# Patient Record
Sex: MALE | Race: WHITE | NOT HISPANIC OR LATINO | Employment: OTHER | URBAN - METROPOLITAN AREA
[De-identification: names, ages, dates, MRNs, and addresses within clinical notes are randomized per-mention and may not be internally consistent; named-entity substitution may affect disease eponyms.]

---

## 2017-01-16 ENCOUNTER — HOSPITAL ENCOUNTER (EMERGENCY)
Facility: HOSPITAL | Age: 82
Discharge: HOME | End: 2017-01-16
Attending: EMERGENCY MEDICINE | Admitting: EMERGENCY MEDICINE
Payer: COMMERCIAL

## 2017-01-16 ENCOUNTER — APPOINTMENT (EMERGENCY)
Dept: RADIOLOGY | Facility: HOSPITAL | Age: 82
End: 2017-01-16
Payer: COMMERCIAL

## 2017-01-16 VITALS
WEIGHT: 150 LBS | HEART RATE: 69 BPM | HEIGHT: 70 IN | BODY MASS INDEX: 21.47 KG/M2 | OXYGEN SATURATION: 99 % | DIASTOLIC BLOOD PRESSURE: 76 MMHG | RESPIRATION RATE: 16 BRPM | SYSTOLIC BLOOD PRESSURE: 163 MMHG | TEMPERATURE: 97.5 F

## 2017-01-16 DIAGNOSIS — R25.1 SHAKINESS: Primary | ICD-10-CM

## 2017-01-16 DIAGNOSIS — R26.81 UNSTEADY: ICD-10-CM

## 2017-01-16 LAB
ANION GAP SERPL CALCULATED.3IONS-SCNC: 7 MMOL/L (ref 4–13)
ATRIAL RATE: 61 BPM
BASOPHILS # BLD AUTO: 0.02 THOUSANDS/ΜL (ref 0–0.1)
BASOPHILS NFR BLD AUTO: 0 % (ref 0–1)
BUN SERPL-MCNC: 17 MG/DL (ref 5–25)
CALCIUM SERPL-MCNC: 9.8 MG/DL (ref 8.3–10.1)
CHLORIDE SERPL-SCNC: 102 MMOL/L (ref 100–108)
CO2 SERPL-SCNC: 28 MMOL/L (ref 21–32)
CREAT SERPL-MCNC: 0.97 MG/DL (ref 0.6–1.3)
EOSINOPHIL # BLD AUTO: 0.08 THOUSAND/ΜL (ref 0–0.61)
EOSINOPHIL NFR BLD AUTO: 1 % (ref 0–6)
ERYTHROCYTE [DISTWIDTH] IN BLOOD BY AUTOMATED COUNT: 13 % (ref 11.6–15.1)
GFR SERPL CREATININE-BSD FRML MDRD: >60 ML/MIN/1.73SQ M
GLUCOSE SERPL-MCNC: 106 MG/DL (ref 65–140)
HCT VFR BLD AUTO: 40.1 % (ref 36.5–49.3)
HGB BLD-MCNC: 13.6 G/DL (ref 12–17)
LYMPHOCYTES # BLD AUTO: 1.02 THOUSANDS/ΜL (ref 0.6–4.47)
LYMPHOCYTES NFR BLD AUTO: 18 % (ref 14–44)
MCH RBC QN AUTO: 31.3 PG (ref 26.8–34.3)
MCHC RBC AUTO-ENTMCNC: 33.9 G/DL (ref 31.4–37.4)
MCV RBC AUTO: 92 FL (ref 82–98)
MONOCYTES # BLD AUTO: 0.34 THOUSAND/ΜL (ref 0.17–1.22)
MONOCYTES NFR BLD AUTO: 6 % (ref 4–12)
NEUTROPHILS # BLD AUTO: 4.2 THOUSANDS/ΜL (ref 1.85–7.62)
NEUTS SEG NFR BLD AUTO: 75 % (ref 43–75)
NRBC BLD AUTO-RTO: 0 /100 WBCS
P AXIS: 78 DEGREES
PLATELET # BLD AUTO: 156 THOUSANDS/UL (ref 149–390)
PMV BLD AUTO: 10.3 FL (ref 8.9–12.7)
POTASSIUM SERPL-SCNC: 4.9 MMOL/L (ref 3.5–5.3)
PR INTERVAL: 168 MS
QRS AXIS: -31 DEGREES
QRSD INTERVAL: 144 MS
QT INTERVAL: 432 MS
QTC INTERVAL: 434 MS
RBC # BLD AUTO: 4.34 MILLION/UL (ref 3.88–5.62)
SODIUM SERPL-SCNC: 137 MMOL/L (ref 136–145)
SPECIMEN SOURCE: NORMAL
T WAVE AXIS: 87 DEGREES
TROPONIN I BLD-MCNC: 0.01 NG/ML (ref 0–0.08)
VENTRICULAR RATE: 61 BPM
WBC # BLD AUTO: 5.68 THOUSAND/UL (ref 4.31–10.16)

## 2017-01-16 PROCEDURE — 84484 ASSAY OF TROPONIN QUANT: CPT

## 2017-01-16 PROCEDURE — 36415 COLL VENOUS BLD VENIPUNCTURE: CPT | Performed by: EMERGENCY MEDICINE

## 2017-01-16 PROCEDURE — 99285 EMERGENCY DEPT VISIT HI MDM: CPT

## 2017-01-16 PROCEDURE — 70450 CT HEAD/BRAIN W/O DYE: CPT

## 2017-01-16 PROCEDURE — 80048 BASIC METABOLIC PNL TOTAL CA: CPT | Performed by: EMERGENCY MEDICINE

## 2017-01-16 PROCEDURE — 85025 COMPLETE CBC W/AUTO DIFF WBC: CPT | Performed by: EMERGENCY MEDICINE

## 2017-01-16 PROCEDURE — 71020 HB CHEST X-RAY 2VW FRONTAL&LATL: CPT

## 2017-01-16 PROCEDURE — 93005 ELECTROCARDIOGRAM TRACING: CPT | Performed by: EMERGENCY MEDICINE

## 2017-01-16 RX ORDER — FLUTICASONE PROPIONATE 50 MCG
1 SPRAY, SUSPENSION (ML) NASAL DAILY
COMMUNITY

## 2017-01-16 RX ORDER — ASPIRIN 81 MG/1
81 TABLET ORAL DAILY
COMMUNITY
End: 2018-09-10

## 2017-01-16 RX ORDER — ONDANSETRON 2 MG/ML
INJECTION INTRAMUSCULAR; INTRAVENOUS
Status: COMPLETED
Start: 2017-01-16 | End: 2017-01-16

## 2017-01-16 RX ORDER — HYDROCHLOROTHIAZIDE 12.5 MG/1
12.5 TABLET ORAL DAILY
COMMUNITY

## 2017-01-16 RX ORDER — ATORVASTATIN CALCIUM 10 MG/1
10 TABLET, FILM COATED ORAL DAILY
COMMUNITY

## 2018-09-10 ENCOUNTER — HOSPITAL ENCOUNTER (INPATIENT)
Facility: HOSPITAL | Age: 83
LOS: 1 days | Discharge: HOME | DRG: 282 | End: 2018-09-13
Attending: EMERGENCY MEDICINE | Admitting: FAMILY MEDICINE
Payer: COMMERCIAL

## 2018-09-10 ENCOUNTER — APPOINTMENT (EMERGENCY)
Dept: RADIOLOGY | Facility: HOSPITAL | Age: 83
DRG: 282 | End: 2018-09-10
Payer: COMMERCIAL

## 2018-09-10 DIAGNOSIS — R74.8 CARDIAC ENZYMES ELEVATED: ICD-10-CM

## 2018-09-10 DIAGNOSIS — R77.8 ELEVATED TROPONIN: ICD-10-CM

## 2018-09-10 DIAGNOSIS — F03.90 DEMENTIA (HCC): ICD-10-CM

## 2018-09-10 DIAGNOSIS — R41.82 ALTERED MENTAL STATUS: Primary | ICD-10-CM

## 2018-09-10 LAB
ALBUMIN SERPL BCP-MCNC: 4.1 G/DL (ref 3.5–5)
ALP SERPL-CCNC: 63 U/L (ref 46–116)
ALT SERPL W P-5'-P-CCNC: 34 U/L (ref 12–78)
ANION GAP SERPL CALCULATED.3IONS-SCNC: 9 MMOL/L (ref 4–13)
AST SERPL W P-5'-P-CCNC: 39 U/L (ref 5–45)
BASOPHILS # BLD AUTO: 0.02 THOUSANDS/ΜL (ref 0–0.1)
BASOPHILS NFR BLD AUTO: 0 % (ref 0–1)
BILIRUB SERPL-MCNC: 0.7 MG/DL (ref 0.2–1)
BUN SERPL-MCNC: 15 MG/DL (ref 5–25)
CALCIUM SERPL-MCNC: 9.6 MG/DL (ref 8.3–10.1)
CHLORIDE SERPL-SCNC: 101 MMOL/L (ref 100–108)
CO2 SERPL-SCNC: 30 MMOL/L (ref 21–32)
CREAT SERPL-MCNC: 0.8 MG/DL (ref 0.6–1.3)
EOSINOPHIL # BLD AUTO: 0.07 THOUSAND/ΜL (ref 0–0.61)
EOSINOPHIL NFR BLD AUTO: 1 % (ref 0–6)
ERYTHROCYTE [DISTWIDTH] IN BLOOD BY AUTOMATED COUNT: 13.2 % (ref 11.6–15.1)
ETHANOL SERPL-MCNC: <3 MG/DL (ref 0–3)
GFR SERPL CREATININE-BSD FRML MDRD: 80 ML/MIN/1.73SQ M
GLUCOSE SERPL-MCNC: 108 MG/DL (ref 65–140)
GLUCOSE SERPL-MCNC: 114 MG/DL (ref 65–140)
HCT VFR BLD AUTO: 42.8 % (ref 36.5–49.3)
HGB BLD-MCNC: 14.1 G/DL (ref 12–17)
IMM GRANULOCYTES # BLD AUTO: 0.01 THOUSAND/UL (ref 0–0.2)
IMM GRANULOCYTES NFR BLD AUTO: 0 % (ref 0–2)
LYMPHOCYTES # BLD AUTO: 0.91 THOUSANDS/ΜL (ref 0.6–4.47)
LYMPHOCYTES NFR BLD AUTO: 13 % (ref 14–44)
MAGNESIUM SERPL-MCNC: 1.9 MG/DL (ref 1.6–2.6)
MCH RBC QN AUTO: 31.5 PG (ref 26.8–34.3)
MCHC RBC AUTO-ENTMCNC: 32.9 G/DL (ref 31.4–37.4)
MCV RBC AUTO: 96 FL (ref 82–98)
MONOCYTES # BLD AUTO: 0.58 THOUSAND/ΜL (ref 0.17–1.22)
MONOCYTES NFR BLD AUTO: 8 % (ref 4–12)
NEUTROPHILS # BLD AUTO: 5.53 THOUSANDS/ΜL (ref 1.85–7.62)
NEUTS SEG NFR BLD AUTO: 78 % (ref 43–75)
NRBC BLD AUTO-RTO: 0 /100 WBCS
PHOSPHATE SERPL-MCNC: 2.6 MG/DL (ref 2.3–4.1)
PLATELET # BLD AUTO: 173 THOUSANDS/UL (ref 149–390)
PMV BLD AUTO: 10.3 FL (ref 8.9–12.7)
POTASSIUM SERPL-SCNC: 3.9 MMOL/L (ref 3.5–5.3)
PROT SERPL-MCNC: 7.5 G/DL (ref 6.4–8.2)
RBC # BLD AUTO: 4.47 MILLION/UL (ref 3.88–5.62)
SODIUM SERPL-SCNC: 140 MMOL/L (ref 136–145)
TROPONIN I SERPL-MCNC: 0.16 NG/ML
WBC # BLD AUTO: 7.12 THOUSAND/UL (ref 4.31–10.16)

## 2018-09-10 PROCEDURE — 80053 COMPREHEN METABOLIC PANEL: CPT | Performed by: EMERGENCY MEDICINE

## 2018-09-10 PROCEDURE — 36415 COLL VENOUS BLD VENIPUNCTURE: CPT | Performed by: EMERGENCY MEDICINE

## 2018-09-10 PROCEDURE — 85025 COMPLETE CBC W/AUTO DIFF WBC: CPT | Performed by: EMERGENCY MEDICINE

## 2018-09-10 PROCEDURE — 84100 ASSAY OF PHOSPHORUS: CPT | Performed by: EMERGENCY MEDICINE

## 2018-09-10 PROCEDURE — 83735 ASSAY OF MAGNESIUM: CPT | Performed by: EMERGENCY MEDICINE

## 2018-09-10 PROCEDURE — 82948 REAGENT STRIP/BLOOD GLUCOSE: CPT

## 2018-09-10 PROCEDURE — 80320 DRUG SCREEN QUANTALCOHOLS: CPT | Performed by: EMERGENCY MEDICINE

## 2018-09-10 PROCEDURE — 84484 ASSAY OF TROPONIN QUANT: CPT | Performed by: EMERGENCY MEDICINE

## 2018-09-10 PROCEDURE — G0480 DRUG TEST DEF 1-7 CLASSES: HCPCS | Performed by: EMERGENCY MEDICINE

## 2018-09-10 PROCEDURE — 70450 CT HEAD/BRAIN W/O DYE: CPT

## 2018-09-10 PROCEDURE — 93005 ELECTROCARDIOGRAM TRACING: CPT

## 2018-09-10 RX ORDER — ASPIRIN 81 MG/1
324 TABLET, CHEWABLE ORAL ONCE
Status: COMPLETED | OUTPATIENT
Start: 2018-09-10 | End: 2018-09-10

## 2018-09-10 RX ADMIN — ASPIRIN 81 MG 324 MG: 81 TABLET ORAL at 23:20

## 2018-09-11 PROBLEM — F03.90 DEMENTIA (HCC): Status: ACTIVE | Noted: 2018-09-11

## 2018-09-11 PROBLEM — I10 HYPERTENSION: Status: ACTIVE | Noted: 2018-09-11

## 2018-09-11 PROBLEM — R77.8 ELEVATED TROPONIN: Status: ACTIVE | Noted: 2018-09-11

## 2018-09-11 PROBLEM — E78.5 HYPERLIPIDEMIA: Status: ACTIVE | Noted: 2018-09-11

## 2018-09-11 LAB
ANION GAP SERPL CALCULATED.3IONS-SCNC: 6 MMOL/L (ref 4–13)
ATRIAL RATE: 62 BPM
ATRIAL RATE: 68 BPM
ATRIAL RATE: 76 BPM
BACTERIA UR QL AUTO: ABNORMAL /HPF
BILIRUB UR QL STRIP: NEGATIVE
BUN SERPL-MCNC: 14 MG/DL (ref 5–25)
CALCIUM SERPL-MCNC: 9.6 MG/DL (ref 8.3–10.1)
CHLORIDE SERPL-SCNC: 104 MMOL/L (ref 100–108)
CLARITY UR: CLEAR
CO2 SERPL-SCNC: 32 MMOL/L (ref 21–32)
COLOR UR: YELLOW
CREAT SERPL-MCNC: 0.79 MG/DL (ref 0.6–1.3)
ERYTHROCYTE [DISTWIDTH] IN BLOOD BY AUTOMATED COUNT: 13.2 % (ref 11.6–15.1)
GFR SERPL CREATININE-BSD FRML MDRD: 81 ML/MIN/1.73SQ M
GLUCOSE SERPL-MCNC: 79 MG/DL (ref 65–140)
GLUCOSE UR STRIP-MCNC: NEGATIVE MG/DL
HCT VFR BLD AUTO: 42 % (ref 36.5–49.3)
HGB BLD-MCNC: 13.5 G/DL (ref 12–17)
HGB UR QL STRIP.AUTO: ABNORMAL
KETONES UR STRIP-MCNC: NEGATIVE MG/DL
LEUKOCYTE ESTERASE UR QL STRIP: NEGATIVE
MAGNESIUM SERPL-MCNC: 2.1 MG/DL (ref 1.6–2.6)
MCH RBC QN AUTO: 31.3 PG (ref 26.8–34.3)
MCHC RBC AUTO-ENTMCNC: 32.1 G/DL (ref 31.4–37.4)
MCV RBC AUTO: 97 FL (ref 82–98)
NITRITE UR QL STRIP: NEGATIVE
NON-SQ EPI CELLS URNS QL MICRO: ABNORMAL /HPF
P AXIS: 82 DEGREES
P AXIS: 84 DEGREES
P AXIS: 85 DEGREES
PH UR STRIP.AUTO: 5.5 [PH] (ref 5–9)
PHOSPHATE SERPL-MCNC: 3 MG/DL (ref 2.3–4.1)
PLATELET # BLD AUTO: 153 THOUSANDS/UL (ref 149–390)
PMV BLD AUTO: 10.3 FL (ref 8.9–12.7)
POTASSIUM SERPL-SCNC: 4.4 MMOL/L (ref 3.5–5.3)
PR INTERVAL: 156 MS
PR INTERVAL: 156 MS
PR INTERVAL: 160 MS
PROT UR STRIP-MCNC: NEGATIVE MG/DL
QRS AXIS: 18 DEGREES
QRS AXIS: 2 DEGREES
QRS AXIS: 48 DEGREES
QRSD INTERVAL: 138 MS
QRSD INTERVAL: 142 MS
QRSD INTERVAL: 144 MS
QT INTERVAL: 398 MS
QT INTERVAL: 424 MS
QT INTERVAL: 430 MS
QTC INTERVAL: 436 MS
QTC INTERVAL: 447 MS
QTC INTERVAL: 450 MS
RBC # BLD AUTO: 4.32 MILLION/UL (ref 3.88–5.62)
RBC #/AREA URNS AUTO: ABNORMAL /HPF
SODIUM SERPL-SCNC: 142 MMOL/L (ref 136–145)
SP GR UR STRIP.AUTO: 1.02 (ref 1–1.03)
T WAVE AXIS: 87 DEGREES
T WAVE AXIS: 90 DEGREES
T WAVE AXIS: 98 DEGREES
TROPONIN I SERPL-MCNC: 0.28 NG/ML
TROPONIN I SERPL-MCNC: 0.3 NG/ML
TROPONIN I SERPL-MCNC: 0.3 NG/ML
TROPONIN I SERPL-MCNC: 0.32 NG/ML
UROBILINOGEN UR QL STRIP.AUTO: 0.2 E.U./DL
VENTRICULAR RATE: 62 BPM
VENTRICULAR RATE: 68 BPM
VENTRICULAR RATE: 76 BPM
WBC # BLD AUTO: 5.1 THOUSAND/UL (ref 4.31–10.16)
WBC #/AREA URNS AUTO: ABNORMAL /HPF

## 2018-09-11 PROCEDURE — 87081 CULTURE SCREEN ONLY: CPT | Performed by: FAMILY MEDICINE

## 2018-09-11 PROCEDURE — 93010 ELECTROCARDIOGRAM REPORT: CPT | Performed by: INTERNAL MEDICINE

## 2018-09-11 PROCEDURE — 84100 ASSAY OF PHOSPHORUS: CPT | Performed by: FAMILY MEDICINE

## 2018-09-11 PROCEDURE — 80048 BASIC METABOLIC PNL TOTAL CA: CPT | Performed by: FAMILY MEDICINE

## 2018-09-11 PROCEDURE — 99222 1ST HOSP IP/OBS MODERATE 55: CPT | Performed by: INTERNAL MEDICINE

## 2018-09-11 PROCEDURE — 85027 COMPLETE CBC AUTOMATED: CPT | Performed by: FAMILY MEDICINE

## 2018-09-11 PROCEDURE — 84484 ASSAY OF TROPONIN QUANT: CPT | Performed by: FAMILY MEDICINE

## 2018-09-11 PROCEDURE — 81001 URINALYSIS AUTO W/SCOPE: CPT | Performed by: EMERGENCY MEDICINE

## 2018-09-11 PROCEDURE — 93005 ELECTROCARDIOGRAM TRACING: CPT

## 2018-09-11 PROCEDURE — 83735 ASSAY OF MAGNESIUM: CPT | Performed by: FAMILY MEDICINE

## 2018-09-11 PROCEDURE — 99285 EMERGENCY DEPT VISIT HI MDM: CPT

## 2018-09-11 RX ORDER — ACETAMINOPHEN 325 MG/1
650 TABLET ORAL EVERY 6 HOURS PRN
Status: DISCONTINUED | OUTPATIENT
Start: 2018-09-11 | End: 2018-09-13 | Stop reason: HOSPADM

## 2018-09-11 RX ORDER — HYDROCHLOROTHIAZIDE 12.5 MG/1
12.5 TABLET ORAL DAILY
Status: DISCONTINUED | OUTPATIENT
Start: 2018-09-11 | End: 2018-09-13 | Stop reason: HOSPADM

## 2018-09-11 RX ORDER — LOSARTAN POTASSIUM 50 MG/1
50 TABLET ORAL DAILY
Status: DISCONTINUED | OUTPATIENT
Start: 2018-09-11 | End: 2018-09-13 | Stop reason: HOSPADM

## 2018-09-11 RX ORDER — ATORVASTATIN CALCIUM 10 MG/1
10 TABLET, FILM COATED ORAL DAILY
Status: DISCONTINUED | OUTPATIENT
Start: 2018-09-11 | End: 2018-09-13 | Stop reason: HOSPADM

## 2018-09-11 RX ORDER — FLUTICASONE PROPIONATE 50 MCG
1 SPRAY, SUSPENSION (ML) NASAL DAILY
Status: DISCONTINUED | OUTPATIENT
Start: 2018-09-11 | End: 2018-09-13 | Stop reason: HOSPADM

## 2018-09-11 RX ADMIN — HYDROCHLOROTHIAZIDE 12.5 MG: 12.5 TABLET ORAL at 08:58

## 2018-09-11 RX ADMIN — ENOXAPARIN SODIUM 40 MG: 40 INJECTION SUBCUTANEOUS at 08:58

## 2018-09-11 RX ADMIN — LOSARTAN POTASSIUM 50 MG: 50 TABLET, FILM COATED ORAL at 08:58

## 2018-09-11 RX ADMIN — ATORVASTATIN CALCIUM 10 MG: 10 TABLET, FILM COATED ORAL at 08:58

## 2018-09-11 RX ADMIN — FLUTICASONE PROPIONATE 1 SPRAY: 50 SPRAY, METERED NASAL at 08:58

## 2018-09-11 NOTE — ED NOTES
Client placed on bedside monitor due to elevated troponin  Client denies chest pain and denies SOB  Client oriented to the fact that he will be staying in the hospital tonight and he is aware that his wife has been contacted    Call bell given to patient     Vaishali Osuna RN  09/10/18 3000

## 2018-09-11 NOTE — ASSESSMENT & PLAN NOTE
Blood pressure was elevated on arrival with 186/85  Blood pressure has improved  · Will continue with home medication of losartan and hydrochlorothiazide
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
## 2018-09-11 NOTE — ASSESSMENT & PLAN NOTE
Patient has had dementia for the past 1 year that is gradually worsening  Patient gets lost locally    · Alert and oriented X 1  · Stable
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
## 2018-09-11 NOTE — ASSESSMENT & PLAN NOTE
Troponin 0 16 on admission  EKG showed normal sinus rhythm with an old left bundle branch block  Troponin increased 0 16-0 30-0 32-0 30-0 28  · Telemetry monitoring  · Cardiology consulted   · Stress test shows no signs of ischemia/infarction     · Echo results pending   · Awaiting disposition
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
## 2018-09-11 NOTE — CONSULTS
CARDIOLOGY CONSULTATION  Flo Person 80 y o  male MRN: [id number]  Unit/Bed#: [address] [place], [place] [id number] Encounter: [id number]      History of Present Illness   Physician Requesting Consult: Irina Lazaro MD  Reason for Consult / Principal Problem: Elevated troponins    Assessment/Plan     Assessment:  Elevated troponins in the absence of chest pain  Left bundle branch block    Plan:  Echocardiogram Tues  Stress Test NM on Wed  Repeat troponin q 3h until two values stabilized or decline  Daily EKGs  Continue Lipitor 10 mg daily - obtain lipid panel in a m  blood work  Continue home losartan and HCTZ dosing      HPI: Flo Person is a 80y o  year old male with a PMH of severe dementia, HTN, HLD, who presents by AMS after being found driving 5 [place] on [address] due to his dementia  Patient had left his house around 7:00 a m, and was found around 11:00 p m  Lulu Scruggsan Per wife he has had dementia for over 1 year and usually only gets lost in the local area  He has not had any symptoms while here, other than dementia  No chest pain, no palpitations, no edema, no shortness of breath, no abdominal pain  CT H was normal   Troponin in ED was 0 16, and on repeat today was 0 30  History of HTN, no cardiac surgeries  Historical Information   Past Medical History:   Diagnosis Date    Hyperlipidemia     Hypertension      History reviewed  No pertinent surgical history  History   Alcohol Use    Yes     Comment: socially     History   Drug Use No     History   Smoking Status    Former Smoker   Smokeless Tobacco    Never Used     History reviewed  No pertinent family history  Meds/Allergies   Prior to Admission medications    Medication Sig Start Date End Date Taking?  Authorizing Provider   atorvastatin (LIPITOR) 10 mg tablet Take 10 mg by mouth daily   Yes Historical Provider, MD   fluticasone (FLONASE) 50 mcg/act nasal spray 1 spray into each nostril daily   Yes Historical Provider, MD   hydrochlorothiazide (HYDRODIURIL) 12 5 mg tablet Take 12 5 mg by mouth daily   Yes Historical Provider, MD   LOSARTAN POTASSIUM PO Take 50 mg by mouth daily   Yes Historical Provider, MD     Current Facility-Administered Medications   Medication Dose Route Frequency Provider Last Rate Last Dose    acetaminophen (TYLENOL) tablet 650 mg  650 mg Oral Q6H PRN Pop Latif MD        atorvastatin (LIPITOR) tablet 10 mg  10 mg Oral Daily Pop Latif MD        enoxaparin (LOVENOX) subcutaneous injection 40 mg  40 mg Subcutaneous Daily Pop Latif MD        fluticasone (FLONASE) 50 mcg/act nasal spray 1 spray  1 spray Nasal Daily Pop Latif MD        hydrochlorothiazide (HYDRODIURIL) tablet 12 5 mg  12 5 mg Oral Daily Pop Latif MD        losartan (COZAAR) tablet 50 mg  50 mg Oral Daily Pop Latif MD         Allergies   Allergen Reactions    Penicillins        Review of systems  CONSTITUTIONAL:  No weight loss, fever, chills, weakness or fatigue  HEENT:  Eyes:  No visual loss, blurred vision, double vision or yellow sclerae  Ears, Nose, Throat:  No hearing loss, sneezing, congestion, runny nose or sore throat  SKIN:  No rash or itching  CARDIOVASCULAR:  No chest pain, chest pressure or chest discomfort  No palpitations or edema  RESPIRATORY:  No shortness of breath, cough or sputum  GASTROINTESTINAL:  No anorexia, nausea, vomiting or diarrhea  No abdominal pain or blood  GENITOURINARY:  Burning on urination  No flank pain  NEUROLOGICAL:  No headache, dizziness, syncope, paralysis, ataxia, numbness or tingling in the extremities  No change in bowel or bladder control  MUSCULOSKELETAL:  No muscle, back pain, joint pain or stiffness  HEMATOLOGIC:  No anemia, bleeding or bruising  LYMPHATICS:  No enlarged nodes  No history of splenectomy  PSYCHIATRIC:  No active suicidal or homicidal ideation  ENDOCRINOLOGIC:  No reports of sweating, cold or heat intolerance  No polyuria or polydipsia    ALLERGIES:  No history of asthma, hives, eczema or rhinitis  More than 10 systems reviewed and otherwise noncontributory  Objective   Vitals: Blood pressure 169/79, pulse 75, temperature 98 2 °F (36 8 °C), temperature source Oral, resp  rate 18, height 5' 7" (1 702 m), weight 62 6 kg (138 lb), SpO2 98 %  Physical Exam   Constitutional: He appears well-developed and well-nourished  No distress  HENT:   Head: Normocephalic and atraumatic  Right Ear: External ear normal    Left Ear: External ear normal    Nose: Nose normal    Mouth/Throat: Oropharynx is clear and moist  No oropharyngeal exudate  Eyes: Conjunctivae and EOM are normal  Pupils are equal, round, and reactive to light  Right eye exhibits no discharge  Left eye exhibits no discharge  No scleral icterus  Neck: Normal range of motion  Neck supple  No tracheal deviation present  No thyromegaly present  Cardiovascular: Normal rate, regular rhythm, normal heart sounds and intact distal pulses  Exam reveals no gallop and no friction rub  No murmur heard  Pulmonary/Chest: Effort normal and breath sounds normal  No stridor  No respiratory distress  He has no wheezes  He has no rales  He exhibits no tenderness  Abdominal: Soft  Bowel sounds are normal  He exhibits no distension and no mass  There is no tenderness  There is no rebound and no guarding  Musculoskeletal: Normal range of motion  He exhibits no edema, tenderness or deformity  Lymphadenopathy:     He has no cervical adenopathy  Neurological: He is alert  Skin: Skin is warm and dry  No rash noted  He is not diaphoretic  No erythema  No pallor  Psychiatric: He has a normal mood and affect  His behavior is normal    Patient is very confused  He is a pleasant, and happy patient with severe dementia  Thinks that he lives in [address] [place] [address]  He is completely inappropriate in the context of his speech  Nursing note and vitals reviewed      Recent Results (from the past 24 hour(s))   Fingerstick Glucose (POCT) Collection Time: 09/10/18 10:00 PM   Result Value Ref Range    POC Glucose 114 65 - 140 mg/dl   Troponin I    Collection Time: 09/10/18 10:31 PM   Result Value Ref Range    Troponin I 0 16 (H) <=0 04 ng/mL   Magnesium    Collection Time: 09/10/18 10:31 PM   Result Value Ref Range    Magnesium 1 9 1 6 - 2 6 mg/dL   Phosphorus    Collection Time: 09/10/18 10:31 PM   Result Value Ref Range    Phosphorus 2 6 2 3 - 4 1 mg/dL   CBC and differential    Collection Time: 09/10/18 11:12 PM   Result Value Ref Range    WBC 7 12 4 31 - 10 16 Thousand/uL    RBC 4 47 3 88 - 5 62 Million/uL    Hemoglobin 14 1 12 0 - 17 0 g/dL    Hematocrit 42 8 36 5 - 49 3 %    MCV 96 82 - 98 fL    MCH 31 5 26 8 - 34 3 pg    MCHC 32 9 31 4 - 37 4 g/dL    RDW 13 2 11 6 - 15 1 %    MPV 10 3 8 9 - 12 7 fL    Platelets 919 915 - 270 Thousands/uL    nRBC 0 /100 WBCs    Neutrophils Relative 78 (H) 43 - 75 %    Immat GRANS % 0 0 - 2 %    Lymphocytes Relative 13 (L) 14 - 44 %    Monocytes Relative 8 4 - 12 %    Eosinophils Relative 1 0 - 6 %    Basophils Relative 0 0 - 1 %    Neutrophils Absolute 5 53 1 85 - 7 62 Thousands/µL    Immature Grans Absolute 0 01 0 00 - 0 20 Thousand/uL    Lymphocytes Absolute 0 91 0 60 - 4 47 Thousands/µL    Monocytes Absolute 0 58 0 17 - 1 22 Thousand/µL    Eosinophils Absolute 0 07 0 00 - 0 61 Thousand/µL    Basophils Absolute 0 02 0 00 - 0 10 Thousands/µL   Comprehensive metabolic panel    Collection Time: 09/10/18 11:12 PM   Result Value Ref Range    Sodium 140 136 - 145 mmol/L    Potassium 3 9 3 5 - 5 3 mmol/L    Chloride 101 100 - 108 mmol/L    CO2 30 21 - 32 mmol/L    ANION GAP 9 4 - 13 mmol/L    BUN 15 5 - 25 mg/dL    Creatinine 0 80 0 60 - 1 30 mg/dL    Glucose 108 65 - 140 mg/dL    Calcium 9 6 8 3 - 10 1 mg/dL    AST 39 5 - 45 U/L    ALT 34 12 - 78 U/L    Alkaline Phosphatase 63 46 - 116 U/L    Total Protein 7 5 6 4 - 8 2 g/dL    Albumin 4 1 3 5 - 5 0 g/dL    Total Bilirubin 0 70 0 20 - 1 00 mg/dL    eGFR 80 ml/min/1 73sq m   Ethanol    Collection Time: 09/10/18 11:12 PM   Result Value Ref Range    Ethanol Lvl <3 0 - 3 mg/dL   UA w Reflex to Microscopic w Reflex to Culture    Collection Time: 09/11/18  3:24 AM   Result Value Ref Range    Color, UA Yellow     Clarity, UA Clear     Specific Gravity, UA 1 020 1 000 - 1 030    pH, UA 5 5 5 0 - 9 0    Leukocytes, UA Negative Negative    Nitrite, UA Negative Negative    Protein, UA Negative Negative mg/dl    Glucose, UA Negative Negative mg/dl    Ketones, UA Negative Negative mg/dl    Urobilinogen, UA 0 2 0 2, 1 0 E U /dl E U /dl    Bilirubin, UA Negative Negative    Blood, UA Trace-Intact (A) Negative   Urine Microscopic    Collection Time: 09/11/18  3:24 AM   Result Value Ref Range    RBC, UA 1-2 (A) None Seen, 0-5 /hpf    WBC, UA 1-2 (A) None Seen, 0-5, 5-55, 5-65 /hpf    Epithelial Cells None Seen None Seen, Occasional /hpf    Bacteria, UA Occasional None Seen, Occasional /hpf   Troponin I    Collection Time: 09/11/18  6:00 AM   Result Value Ref Range    Troponin I 0 30 (H) <=0 04 ng/mL   Basic metabolic panel    Collection Time: 09/11/18  6:00 AM   Result Value Ref Range    Sodium 142 136 - 145 mmol/L    Potassium 4 4 3 5 - 5 3 mmol/L    Chloride 104 100 - 108 mmol/L    CO2 32 21 - 32 mmol/L    ANION GAP 6 4 - 13 mmol/L    BUN 14 5 - 25 mg/dL    Creatinine 0 79 0 60 - 1 30 mg/dL    Glucose 79 65 - 140 mg/dL    Calcium 9 6 8 3 - 10 1 mg/dL    eGFR 81 ml/min/1 73sq m   Magnesium    Collection Time: 09/11/18  6:00 AM   Result Value Ref Range    Magnesium 2 1 1 6 - 2 6 mg/dL   Phosphorus    Collection Time: 09/11/18  6:00 AM   Result Value Ref Range    Phosphorus 3 0 2 3 - 4 1 mg/dL   CBC (With Platelets)    Collection Time: 09/11/18  6:00 AM   Result Value Ref Range    WBC 5 10 4 31 - 10 16 Thousand/uL    RBC 4 32 3 88 - 5 62 Million/uL    Hemoglobin 13 5 12 0 - 17 0 g/dL    Hematocrit 42 0 36 5 - 49 3 %    MCV 97 82 - 98 fL    MCH 31 3 26 8 - 34 3 pg    MCHC 32 1 31 4 - 37 4 g/dL    RDW 13 2 11 6 - 15 1 %    Platelets 069 247 - 185 Thousands/uL    MPV 10 3 8 9 - 12 7 fL     Imaging: Ct Head Without Contrast            Result Date: 9/10/2018  Impression: No acute intracranial abnormality  Microangiopathic changes  Cardiac testing:   No results found for this or any previous visit  EKG:  Normal sinus rhythm, left bundle branch block, 68 bpm   No ST changes  Counseling / Coordination of Care  Total time spent today 30 minutes  Greater than 50% of total time was spent with the patient and / or family counseling and / or coordination of care  Celina Quijano MD [facility]    "This note has been constructed using a voice recognition system  Therefore there may be syntax, spelling, and/or grammatical errors   Please call if you have any questions  "

## 2018-09-12 ENCOUNTER — APPOINTMENT (OUTPATIENT)
Dept: RADIOLOGY | Facility: HOSPITAL | Age: 83
DRG: 282 | End: 2018-09-12
Payer: COMMERCIAL

## 2018-09-12 ENCOUNTER — APPOINTMENT (OUTPATIENT)
Dept: NON INVASIVE DIAGNOSTICS | Facility: HOSPITAL | Age: 83
DRG: 282 | End: 2018-09-12
Payer: COMMERCIAL

## 2018-09-12 PROBLEM — I21.4 NSTEMI, INITIAL EPISODE OF CARE (HCC): Status: ACTIVE | Noted: 2018-09-11

## 2018-09-12 LAB
ANION GAP SERPL CALCULATED.3IONS-SCNC: 4 MMOL/L (ref 4–13)
BASOPHILS # BLD AUTO: 0.03 THOUSANDS/ΜL (ref 0–0.1)
BASOPHILS NFR BLD AUTO: 1 % (ref 0–1)
BUN SERPL-MCNC: 18 MG/DL (ref 5–25)
CALCIUM SERPL-MCNC: 9.2 MG/DL (ref 8.3–10.1)
CHLORIDE SERPL-SCNC: 104 MMOL/L (ref 100–108)
CHOLEST SERPL-MCNC: 146 MG/DL (ref 50–200)
CO2 SERPL-SCNC: 32 MMOL/L (ref 21–32)
CREAT SERPL-MCNC: 0.83 MG/DL (ref 0.6–1.3)
EOSINOPHIL # BLD AUTO: 0.29 THOUSAND/ΜL (ref 0–0.61)
EOSINOPHIL NFR BLD AUTO: 5 % (ref 0–6)
ERYTHROCYTE [DISTWIDTH] IN BLOOD BY AUTOMATED COUNT: 13.3 % (ref 11.6–15.1)
GFR SERPL CREATININE-BSD FRML MDRD: 79 ML/MIN/1.73SQ M
GLUCOSE P FAST SERPL-MCNC: 94 MG/DL (ref 65–99)
GLUCOSE SERPL-MCNC: 94 MG/DL (ref 65–140)
HCT VFR BLD AUTO: 40.9 % (ref 36.5–49.3)
HDLC SERPL-MCNC: 68 MG/DL (ref 40–60)
HGB BLD-MCNC: 13.4 G/DL (ref 12–17)
IMM GRANULOCYTES # BLD AUTO: 0.01 THOUSAND/UL (ref 0–0.2)
IMM GRANULOCYTES NFR BLD AUTO: 0 % (ref 0–2)
LDLC SERPL CALC-MCNC: 70 MG/DL (ref 0–100)
LYMPHOCYTES # BLD AUTO: 1.06 THOUSANDS/ΜL (ref 0.6–4.47)
LYMPHOCYTES NFR BLD AUTO: 19 % (ref 14–44)
MAGNESIUM SERPL-MCNC: 2.1 MG/DL (ref 1.6–2.6)
MCH RBC QN AUTO: 31.4 PG (ref 26.8–34.3)
MCHC RBC AUTO-ENTMCNC: 32.8 G/DL (ref 31.4–37.4)
MCV RBC AUTO: 96 FL (ref 82–98)
MONOCYTES # BLD AUTO: 0.52 THOUSAND/ΜL (ref 0.17–1.22)
MONOCYTES NFR BLD AUTO: 9 % (ref 4–12)
MRSA NOSE QL CULT: NORMAL
NEUTROPHILS # BLD AUTO: 3.61 THOUSANDS/ΜL (ref 1.85–7.62)
NEUTS SEG NFR BLD AUTO: 66 % (ref 43–75)
NONHDLC SERPL-MCNC: 78 MG/DL
NRBC BLD AUTO-RTO: 0 /100 WBCS
PHOSPHATE SERPL-MCNC: 2.7 MG/DL (ref 2.3–4.1)
PLATELET # BLD AUTO: 143 THOUSANDS/UL (ref 149–390)
PMV BLD AUTO: 10.1 FL (ref 8.9–12.7)
POTASSIUM SERPL-SCNC: 3.6 MMOL/L (ref 3.5–5.3)
RBC # BLD AUTO: 4.27 MILLION/UL (ref 3.88–5.62)
SODIUM SERPL-SCNC: 140 MMOL/L (ref 136–145)
TRIGL SERPL-MCNC: 41 MG/DL
WBC # BLD AUTO: 5.52 THOUSAND/UL (ref 4.31–10.16)

## 2018-09-12 PROCEDURE — 93018 CV STRESS TEST I&R ONLY: CPT | Performed by: INTERNAL MEDICINE

## 2018-09-12 PROCEDURE — 83735 ASSAY OF MAGNESIUM: CPT | Performed by: FAMILY MEDICINE

## 2018-09-12 PROCEDURE — 80061 LIPID PANEL: CPT | Performed by: STUDENT IN AN ORGANIZED HEALTH CARE EDUCATION/TRAINING PROGRAM

## 2018-09-12 PROCEDURE — A9502 TC99M TETROFOSMIN: HCPCS

## 2018-09-12 PROCEDURE — 93306 TTE W/DOPPLER COMPLETE: CPT

## 2018-09-12 PROCEDURE — 99232 SBSQ HOSP IP/OBS MODERATE 35: CPT | Performed by: INTERNAL MEDICINE

## 2018-09-12 PROCEDURE — 85025 COMPLETE CBC W/AUTO DIFF WBC: CPT | Performed by: FAMILY MEDICINE

## 2018-09-12 PROCEDURE — 84100 ASSAY OF PHOSPHORUS: CPT | Performed by: FAMILY MEDICINE

## 2018-09-12 PROCEDURE — 78452 HT MUSCLE IMAGE SPECT MULT: CPT

## 2018-09-12 PROCEDURE — 78452 HT MUSCLE IMAGE SPECT MULT: CPT | Performed by: INTERNAL MEDICINE

## 2018-09-12 PROCEDURE — 93016 CV STRESS TEST SUPVJ ONLY: CPT | Performed by: INTERNAL MEDICINE

## 2018-09-12 PROCEDURE — 93306 TTE W/DOPPLER COMPLETE: CPT | Performed by: INTERNAL MEDICINE

## 2018-09-12 PROCEDURE — 93017 CV STRESS TEST TRACING ONLY: CPT

## 2018-09-12 PROCEDURE — 80048 BASIC METABOLIC PNL TOTAL CA: CPT | Performed by: FAMILY MEDICINE

## 2018-09-12 RX ADMIN — REGADENOSON 0.4 MG: 0.08 INJECTION, SOLUTION INTRAVENOUS at 12:39

## 2018-09-12 RX ADMIN — LOSARTAN POTASSIUM 50 MG: 50 TABLET, FILM COATED ORAL at 14:21

## 2018-09-12 RX ADMIN — FLUTICASONE PROPIONATE 1 SPRAY: 50 SPRAY, METERED NASAL at 14:22

## 2018-09-12 NOTE — PROGRESS NOTES
Cardiology Progress Note     Everett Medina 80 y o  male MRN: [id number]  Unit/Bed#: [address] [place] [address] [id number] Encounter: [id number]    Principal Problem:    NSTEMI, initial episode of care [facility])  Active Problems:    Dementia    Hyperlipidemia    Hypertension    Assessment:  NSTEMI type 2  Likely elevated troponin secondary to hypertensive urgency on admission due to stress    Plan:  Echocardiogram from yesterday pending official read  NM stress test today  Possible discharge this afternoon if stress test normal  Troponins peaked and declined  - last 0 28, no more necessary  Blood pressure well controlled on Cozaar, & HCTZ    Chief Complaint:   Chief Complaint   Patient presents with    Altered Mental Status     brought in by squad and police, client found driving approx 5 mph [place] [address]     Subjective:   Patient seen and examined at bedside this morning  Patient still reports no complaints at this time  Patient wanted to get up to use the bathroom, and did so without problem  He denies chest pain, palpitations, leg swelling, & dyspnea on exertion  Patient had echocardiogram performed yesterday, and is waiting to be brought downstairs for his stress test today  Patient has severe dementia, and has inability to have logical conversation      Objective:   Vitals: /61 (BP Location: Right arm)   Pulse 71   Temp 98 °F (36 7 °C) (Oral)   Resp 18   Ht 5' 7" (1 702 m)   Wt 62 6 kg (138 lb)   SpO2 96%   BMI 21 61 kg/m²     Temp (24hrs), Av 1 °F (36 7 °C), Min:98 °F (36 7 °C), Max:98 2 °F (36 8 °C)  Current Temperature: 98 °F (36 7 °C)    Intake/Output Summary (Last 24 hours) at 18 1155  Last data filed at 18 0501   Gross per 24 hour   Intake              240 ml   Output              500 ml   Net             -260 ml     Medications:   Prescriptions Prior to Admission   Medication Sig Dispense Refill Last Dose    atorvastatin (LIPITOR) 10 mg tablet Take 10 mg by mouth daily       fluticasone (FLONASE) 50 mcg/act nasal spray 1 spray into each nostril daily       hydrochlorothiazide (HYDRODIURIL) 12 5 mg tablet Take 12 5 mg by mouth daily       LOSARTAN POTASSIUM PO Take 50 mg by mouth daily        Current Facility-Administered Medications   Medication Dose Route Frequency    acetaminophen (TYLENOL) tablet 650 mg  650 mg Oral Q6H PRN    atorvastatin (LIPITOR) tablet 10 mg  10 mg Oral Daily    enoxaparin (LOVENOX) subcutaneous injection 40 mg  40 mg Subcutaneous Daily    fluticasone (FLONASE) 50 mcg/act nasal spray 1 spray  1 spray Nasal Daily    hydrochlorothiazide (HYDRODIURIL) tablet 12 5 mg  12 5 mg Oral Daily    losartan (COZAAR) tablet 50 mg  50 mg Oral Daily     Physical Exam:  General Appearance:   Pleasant, cooperative, not at all oriented to time or place,  no distress, appears stated age   Head:    Normocephalic, without obvious abnormality, atraumatic   Eyes:    PERRL, conjunctiva/corneas clear, EOM's intact, fundi     benign, both eyes        Ears:    Normal TM's and external ear canals, both ears   Nose:   Nares normal, septum midline, mucosa normal, no drainage    or sinus tenderness   Throat:   Lips, mucosa, and tongue normal; teeth and gums normal   Neck:   Supple, symmetrical, trachea midline, no adenopathy;        thyroid:  No enlargement/tenderness/nodules; no carotid    bruit or JVD   Back:     Symmetric, no curvature, ROM normal, no CVA tenderness   Lungs:     Clear to auscultation bilaterally, respirations unlabored   Chest wall:    No tenderness or deformity   Heart:    Regular rate and rhythm, S1 and S2 normal, no murmur, rub   or gallop   Abdomen:     Soft, non-tender, bowel sounds active all four quadrants,     no masses, no organomegaly   Extremities:   Extremities normal, atraumatic, no cyanosis or edema   Pulses:   2+ and symmetric all extremities   Skin:   Skin color, texture, turgor normal, no rashes or lesions   Lymph nodes:   Cervical, supraclavicular, and axillary nodes normal Review of Systems:   General ROS: negative   Psychological ROS: negative for - anxiety, behavioral disorder, concentration difficulties, depression, irritability, mood swings or sleep disturbances  Ophthalmic ROS: negative for - blurry vision, decreased vision, double vision or dry eyes  ENT ROS: negative for - headaches, nasal congestion, sinus pain, sore throat or vertigo  Endocrine ROS: negative for - hot flashes, mood swings, palpitations, polydipsia/polyuria or temperature intolerance  Respiratory ROS: no cough, shortness of breath, or wheezing  Cardiovascular ROS: no chest pain or dyspnea on exertion, reproducible chest pain  Musculoskeletal ROS: negative for - joint pain, joint swelling, muscle pain or muscular weakness  Neurological ROS: no TIA or stroke symptoms  Dermatological ROS: negative for dry skin, eczema and rash        Lab Results: I have personally reviewed pertinent lab results  Results from last 7 days  Lab Units 09/12/18  0525   WBC Thousand/uL 5 52   HEMOGLOBIN g/dL 13 4   HEMATOCRIT % 40 9   PLATELETS Thousands/uL 143*   NEUTROS PCT % 66   LYMPHS PCT % 19   MONOS PCT % 9   EOS PCT % 5        CMP:   Lab Results   Component Value Date     09/12/2018    K 3 6 09/12/2018     09/12/2018    CO2 32 09/12/2018    BUN 18 09/12/2018    CREATININE 0 83 09/12/2018    CALCIUM 9 2 09/12/2018    EGFR 79 09/12/2018     Troponin:   Lab Results   Component Value Date    TROPONINI 0 28 (H) 09/11/2018    Down from 0 32 Peak    Magnesium:   Lab Results   Component Value Date    MG 2 1 09/12/2018     Lipid Profile: Total cholesterol 146, triglycerides 41, HDL 68, LDL 70    Imaging:     Echocardiogram:  Results pending    EKG:  NSR, 62 beats per minute, left bundle branch block    Counseling / Coordination of Care  Total time spent today 30 minutes  Greater than 50% of total time was spent with the patient and / or family counseling and / or coordination of care       Molly Ladd DO

## 2018-09-12 NOTE — PROGRESS NOTES
[facility] Progress [facility] - Liz Bhagat 80 y o  male MRN: [id number]    Unit/Bed#: [address] [place] [address] [id number] Encounter: [id number]      Assessment/Plan:  * Elevated troponin   Assessment & Plan    Troponin 0 16 on admission  EKG showed normal sinus rhythm with an old left bundle branch block  Troponin increased 0 16-0 30-0 32-0 30-0 28  · Telemetry monitoring  · Cardiology consulted   · Stress test for today  · Echo ordered  Dementia   Assessment & Plan    Patient has had dementia for the past 1 year that is gradually worsening  Patient gets lost locally  · Alert and oriented X 1  · Stable        Hypertension   Assessment & Plan    Blood pressure was elevated on arrival with 186/85  Blood pressure has improved  · Will continue with home medication of losartan and hydrochlorothiazide  Hyperlipidemia   Assessment & Plan    Stable, continue with Lipitor  Subjective:   Patient seen and examined at bedside  No overnight events  Patient currently denies any shortness of breath or chest pain  Objective:     Vitals: Blood pressure 159/72, pulse 70, temperature 98 °F (36 7 °C), temperature source Oral, resp  rate 18, height 5' 7" (1 702 m), weight 62 6 kg (138 lb), SpO2 96 %  ,Body mass index is 21 61 kg/m²  Wt Readings from Last 3 Encounters:   09/11/18 62 6 kg (138 lb)   01/16/17 68 kg (150 lb)       Intake/Output Summary (Last 24 hours) at 09/12/18 0600  Last data filed at 09/12/18 0501   Gross per 24 hour   Intake              240 ml   Output              700 ml   Net             -460 ml       Physical Exam:   Physical Exam   Constitutional: He appears well-developed and well-nourished  HENT:   Head: Normocephalic and atraumatic  Nose: Nose normal    Mouth/Throat: Oropharynx is clear and moist  No oropharyngeal exudate  Eyes: EOM are normal  Right eye exhibits no discharge  Left eye exhibits no discharge  Neck: Neck supple     Cardiovascular: Normal rate, regular rhythm and intact distal pulses  Murmur heard  Pulmonary/Chest: Effort normal and breath sounds normal  He has no wheezes  Abdominal: Soft  Bowel sounds are normal  There is no tenderness  Musculoskeletal: He exhibits no edema or tenderness  Neurological: He is alert  Alert and oriented X 1    Skin: Skin is warm  No erythema  Psychiatric: He has a normal mood and affect  His behavior is normal    Vitals reviewed         Recent Results (from the past 24 hour(s))   ECG 12 lead    Collection Time: 09/11/18  8:33 AM   Result Value Ref Range    Ventricular Rate 68 BPM    Atrial Rate 68 BPM    VA Interval 156 ms    QRSD Interval 142 ms    QT Interval 424 ms    QTC Interval 450 ms    P Axis 82 degrees    QRS Axis 48 degrees    T Wave Axis 98 degrees   Troponin I    Collection Time: 09/11/18 11:28 AM   Result Value Ref Range    Troponin I 0 32 (H) <=0 04 ng/mL   Troponin I    Collection Time: 09/11/18  2:56 PM   Result Value Ref Range    Troponin I 0 30 (H) <=0 04 ng/mL   Troponin I    Collection Time: 09/11/18  6:42 PM   Result Value Ref Range    Troponin I 0 28 (H) <=0 04 ng/mL   CBC and differential    Collection Time: 09/12/18  5:25 AM   Result Value Ref Range    WBC 5 52 4 31 - 10 16 Thousand/uL    RBC 4 27 3 88 - 5 62 Million/uL    Hemoglobin 13 4 12 0 - 17 0 g/dL    Hematocrit 40 9 36 5 - 49 3 %    MCV 96 82 - 98 fL    MCH 31 4 26 8 - 34 3 pg    MCHC 32 8 31 4 - 37 4 g/dL    RDW 13 3 11 6 - 15 1 %    MPV 10 1 8 9 - 12 7 fL    Platelets 593 (L) 725 - 390 Thousands/uL    nRBC 0 /100 WBCs    Neutrophils Relative 66 43 - 75 %    Immat GRANS % 0 0 - 2 %    Lymphocytes Relative 19 14 - 44 %    Monocytes Relative 9 4 - 12 %    Eosinophils Relative 5 0 - 6 %    Basophils Relative 1 0 - 1 %    Neutrophils Absolute 3 61 1 85 - 7 62 Thousands/µL    Immature Grans Absolute 0 01 0 00 - 0 20 Thousand/uL    Lymphocytes Absolute 1 06 0 60 - 4 47 Thousands/µL    Monocytes Absolute 0 52 0 17 - 1 22 Thousand/µL    Eosinophils Absolute 0 29 0 00 - 0 61 Thousand/µL    Basophils Absolute 0 03 0 00 - 0 10 Thousands/µL       Current Facility-Administered Medications   Medication Dose Route Frequency Provider Last Rate Last Dose    acetaminophen (TYLENOL) tablet 650 mg  650 mg Oral Q6H PRN Elliott Love MD        atorvastatin (LIPITOR) tablet 10 mg  10 mg Oral Daily Elliott Love MD   10 mg at 09/11/18 0858    enoxaparin (LOVENOX) subcutaneous injection 40 mg  40 mg Subcutaneous Daily Elliott Love MD   40 mg at 09/11/18 0858    fluticasone (FLONASE) 50 mcg/act nasal spray 1 spray  1 spray Nasal Daily Elliott Love MD   1 spray at 09/11/18 0858    hydrochlorothiazide (HYDRODIURIL) tablet 12 5 mg  12 5 mg Oral Daily Elliott Love MD   12 5 mg at 09/11/18 0858    losartan (COZAAR) tablet 50 mg  50 mg Oral Daily Elliott Love MD   50 mg at 09/11/18 0858       Invasive Devices     Peripheral Intravenous Line            Peripheral IV 09/10/18 Left Antecubital 1 day                Lab, Imaging and other studies: I have personally reviewed pertinent reports      VTE Pharmacologic Prophylaxis: Enoxaparin (Lovenox)  VTE Mechanical Prophylaxis: sequential compression device    Elliott Love MD

## 2018-09-13 VITALS
BODY MASS INDEX: 21.66 KG/M2 | RESPIRATION RATE: 18 BRPM | WEIGHT: 138 LBS | OXYGEN SATURATION: 98 % | HEIGHT: 67 IN | DIASTOLIC BLOOD PRESSURE: 65 MMHG | TEMPERATURE: 97.5 F | HEART RATE: 75 BPM | SYSTOLIC BLOOD PRESSURE: 140 MMHG

## 2018-09-13 PROBLEM — I21.4 NSTEMI (NON-ST ELEVATION MYOCARDIAL INFARCTION) (HCC): Status: RESOLVED | Noted: 2018-09-11 | Resolved: 2018-09-13

## 2018-09-13 LAB
ANION GAP SERPL CALCULATED.3IONS-SCNC: 7 MMOL/L (ref 4–13)
BASOPHILS # BLD AUTO: 0.04 THOUSANDS/ΜL (ref 0–0.1)
BASOPHILS NFR BLD AUTO: 1 % (ref 0–1)
BUN SERPL-MCNC: 25 MG/DL (ref 5–25)
CALCIUM SERPL-MCNC: 9.4 MG/DL (ref 8.3–10.1)
CHEST PAIN STATEMENT: NORMAL
CHLORIDE SERPL-SCNC: 104 MMOL/L (ref 100–108)
CO2 SERPL-SCNC: 31 MMOL/L (ref 21–32)
CREAT SERPL-MCNC: 0.95 MG/DL (ref 0.6–1.3)
EOSINOPHIL # BLD AUTO: 0.23 THOUSAND/ΜL (ref 0–0.61)
EOSINOPHIL NFR BLD AUTO: 3 % (ref 0–6)
ERYTHROCYTE [DISTWIDTH] IN BLOOD BY AUTOMATED COUNT: 13.4 % (ref 11.6–15.1)
GFR SERPL CREATININE-BSD FRML MDRD: 72 ML/MIN/1.73SQ M
GLUCOSE SERPL-MCNC: 97 MG/DL (ref 65–140)
HCT VFR BLD AUTO: 46.2 % (ref 36.5–49.3)
HGB BLD-MCNC: 14.9 G/DL (ref 12–17)
IMM GRANULOCYTES # BLD AUTO: 0.02 THOUSAND/UL (ref 0–0.2)
IMM GRANULOCYTES NFR BLD AUTO: 0 % (ref 0–2)
LYMPHOCYTES # BLD AUTO: 1.25 THOUSANDS/ΜL (ref 0.6–4.47)
LYMPHOCYTES NFR BLD AUTO: 18 % (ref 14–44)
MAGNESIUM SERPL-MCNC: 2 MG/DL (ref 1.6–2.6)
MAX DIASTOLIC BP: 75 MMHG
MAX HEART RATE: 113 BPM
MAX PREDICTED HEART RATE: 133 BPM
MAX. SYSTOLIC BP: 177 MMHG
MCH RBC QN AUTO: 31 PG (ref 26.8–34.3)
MCHC RBC AUTO-ENTMCNC: 32.3 G/DL (ref 31.4–37.4)
MCV RBC AUTO: 96 FL (ref 82–98)
MONOCYTES # BLD AUTO: 0.55 THOUSAND/ΜL (ref 0.17–1.22)
MONOCYTES NFR BLD AUTO: 8 % (ref 4–12)
NEUTROPHILS # BLD AUTO: 4.78 THOUSANDS/ΜL (ref 1.85–7.62)
NEUTS SEG NFR BLD AUTO: 70 % (ref 43–75)
NRBC BLD AUTO-RTO: 0 /100 WBCS
PHOSPHATE SERPL-MCNC: 2.8 MG/DL (ref 2.3–4.1)
PLATELET # BLD AUTO: 183 THOUSANDS/UL (ref 149–390)
PMV BLD AUTO: 10 FL (ref 8.9–12.7)
POTASSIUM SERPL-SCNC: 3.5 MMOL/L (ref 3.5–5.3)
PROTOCOL NAME: NORMAL
RBC # BLD AUTO: 4.81 MILLION/UL (ref 3.88–5.62)
REASON FOR TERMINATION: NORMAL
SODIUM SERPL-SCNC: 142 MMOL/L (ref 136–145)
TARGET HR FORMULA: NORMAL
TEST INDICATION: NORMAL
TIME IN EXERCISE PHASE: NORMAL
WBC # BLD AUTO: 6.87 THOUSAND/UL (ref 4.31–10.16)

## 2018-09-13 PROCEDURE — 83735 ASSAY OF MAGNESIUM: CPT | Performed by: FAMILY MEDICINE

## 2018-09-13 PROCEDURE — 84100 ASSAY OF PHOSPHORUS: CPT | Performed by: FAMILY MEDICINE

## 2018-09-13 PROCEDURE — 85025 COMPLETE CBC W/AUTO DIFF WBC: CPT | Performed by: FAMILY MEDICINE

## 2018-09-13 PROCEDURE — 80048 BASIC METABOLIC PNL TOTAL CA: CPT | Performed by: FAMILY MEDICINE

## 2018-09-13 PROCEDURE — 99231 SBSQ HOSP IP/OBS SF/LOW 25: CPT | Performed by: INTERNAL MEDICINE

## 2018-09-13 RX ADMIN — LOSARTAN POTASSIUM 50 MG: 50 TABLET, FILM COATED ORAL at 10:44

## 2018-09-13 RX ADMIN — HYDROCHLOROTHIAZIDE 12.5 MG: 12.5 TABLET ORAL at 10:44

## 2018-09-13 RX ADMIN — ATORVASTATIN CALCIUM 10 MG: 10 TABLET, FILM COATED ORAL at 10:44

## 2018-09-13 NOTE — PROGRESS NOTES
[facility] Practice Progress Note - Cas Kirkpatrick 80 y o  male MRN: [id number]    Unit/Bed#: [address] [place] [address] [id number] Encounter: [id number]      Assessment/Plan:  * NSTEMI (non-ST elevation myocardial infarction) [facility])   Assessment & Plan    Troponin 0 16 on admission  EKG showed normal sinus rhythm with an old left bundle branch block  Troponin increased 0 16-0 30-0 32-0 30-0 28  · Telemetry monitoring  · Cardiology consulted   · Stress test shows no signs of ischemia/infarction  · Echo results pending   · Awaiting disposition           Dementia   Assessment & Plan    Patient has had dementia for the past 1 year that is gradually worsening  Patient gets lost locally  · Alert and oriented X 1  · Stable        Hypertension   Assessment & Plan    Blood pressure was elevated on arrival with 186/85  Blood pressure has improved  · Will continue with home medication of losartan and hydrochlorothiazide  Hyperlipidemia   Assessment & Plan    Stable, continue with Lipitor  Subjective:   Patient seen and examined at bedside  Afebrile  No overnight events  Denies any chest pain or shortness of breath  Objective:     Vitals: Blood pressure 146/70, pulse 84, temperature 98 3 °F (36 8 °C), temperature source Oral, resp  rate 18, height 5' 7" (1 702 m), weight 62 6 kg (138 lb), SpO2 97 %  ,Body mass index is 21 61 kg/m²  Wt Readings from Last 3 Encounters:   09/11/18 62 6 kg (138 lb)   01/16/17 68 kg (150 lb)       Intake/Output Summary (Last 24 hours) at 09/13/18 0553  Last data filed at 09/13/18 0501   Gross per 24 hour   Intake              320 ml   Output                0 ml   Net              320 ml       Physical Exam:   Physical Exam   Constitutional: He appears well-developed and well-nourished  HENT:   Head: Normocephalic and atraumatic  Nose: Nose normal    Mouth/Throat: Oropharynx is clear and moist  No oropharyngeal exudate  Eyes: EOM are normal  Right eye exhibits no discharge   Left eye exhibits no discharge  Neck: Neck supple  Cardiovascular: Normal rate, regular rhythm and intact distal pulses  Murmur heard  Pulmonary/Chest: Effort normal and breath sounds normal  He has no wheezes  Abdominal: Soft  Bowel sounds are normal  There is no tenderness  Musculoskeletal: He exhibits no edema or tenderness  Neurological: He is alert  Alert and oriented X 1   Skin: Skin is warm  No erythema  Psychiatric: He has a normal mood and affect  His behavior is normal    Vitals reviewed  No results found for this or any previous visit (from the past 24 hour(s))  Current Facility-Administered Medications   Medication Dose Route Frequency Provider Last Rate Last Dose    acetaminophen (TYLENOL) tablet 650 mg  650 mg Oral Q6H PRN Naman Thomas MD        atorvastatin (LIPITOR) tablet 10 mg  10 mg Oral Daily Naman Thomas MD   10 mg at 09/11/18 0858    enoxaparin (LOVENOX) subcutaneous injection 40 mg  40 mg Subcutaneous Daily Naman Thomas MD   40 mg at 09/11/18 0858    fluticasone (FLONASE) 50 mcg/act nasal spray 1 spray  1 spray Nasal Daily Naman Thomas MD   1 spray at 09/12/18 1422    hydrochlorothiazide (HYDRODIURIL) tablet 12 5 mg  12 5 mg Oral Daily Naman Thomas MD   12 5 mg at 09/11/18 0858    losartan (COZAAR) tablet 50 mg  50 mg Oral Daily Naman hTomas MD   50 mg at 09/12/18 1421       Invasive Devices          No matching active lines, drains, or airways          Lab, Imaging and other studies: I have personally reviewed pertinent reports      VTE Pharmacologic Prophylaxis: Enoxaparin (Lovenox)  VTE Mechanical Prophylaxis: sequential compression device    Naman Thomas MD
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
## 2018-09-13 NOTE — SOCIAL WORK
Message received from pt's wife  Per wife pt's daughter and grandson will be able to pick pt up this evening between 7:30-8:00 pm this evening  SW will notify nurse and physicians

## 2018-09-13 NOTE — PROGRESS NOTES
Progress Note - Cardiology   Hayley Mendoza 80 y o  male MRN: [id number]  Unit/Bed#: [address] [place] [address] Encounter: [id number]  09/13/18  5:28 PM        Assessment:  1  NSTEMI - normal stress test   Echocardiogram was normal   Likely type 2 NSTEMI  2   Hypertension - blood pressure controlled on current medication regimen  3  Dyslipidemia - continue atorvastatin    Plan:  As above  No further cardiac workup  Patient can be discharged home from cardiac standpoint  Subjective: Hayley Mendoza denies any chest pain or shortness of breath  No overnight events  Meds/Allergies   current meds:   Current Facility-Administered Medications   Medication Dose Route Frequency    acetaminophen (TYLENOL) tablet 650 mg  650 mg Oral Q6H PRN    atorvastatin (LIPITOR) tablet 10 mg  10 mg Oral Daily    enoxaparin (LOVENOX) subcutaneous injection 40 mg  40 mg Subcutaneous Daily    fluticasone (FLONASE) 50 mcg/act nasal spray 1 spray  1 spray Nasal Daily    hydrochlorothiazide (HYDRODIURIL) tablet 12 5 mg  12 5 mg Oral Daily    losartan (COZAAR) tablet 50 mg  50 mg Oral Daily     Allergies   Allergen Reactions    Penicillins        Objective   Vitals: Blood pressure 140/65, pulse 75, temperature 97 5 °F (36 4 °C), temperature source Oral, resp  rate 18, height 5' 7" (1 702 m), weight 62 6 kg (138 lb), SpO2 98 %  , Body mass index is 21 61 kg/m²  Intake/Output Summary (Last 24 hours) at 09/13/18 1728  Last data filed at 09/13/18 0936   Gross per 24 hour   Intake              560 ml   Output                0 ml   Net              560 ml       Physical Exam   Constitutional: He appears healthy  No distress  HENT:   Nose: Nose normal    Mouth/Throat: Oropharynx is clear  Eyes: Conjunctivae are normal  Pupils are equal, round, and reactive to light  Neck: Neck supple  Cardiovascular: Normal rate and regular rhythm  Murmur heard  Pulmonary/Chest: Effort normal and breath sounds normal  He has no wheezes   He has no rales    Abdominal: Soft  He exhibits no distension  There is no tenderness  Musculoskeletal: He exhibits no edema  Neurological: He is alert  Skin: Skin is warm and dry  No rash noted         Lab Results:     Troponins:   Results from last 7 days  Lab Units 09/11/18  1842 09/11/18  1456 09/11/18  1128   TROPONIN I ng/mL 0 28* 0 30* 0 32*       Recent Results (from the past 24 hour(s))   CBC and differential    Collection Time: 09/13/18  5:43 AM   Result Value Ref Range    WBC 6 87 4 31 - 10 16 Thousand/uL    RBC 4 81 3 88 - 5 62 Million/uL    Hemoglobin 14 9 12 0 - 17 0 g/dL    Hematocrit 46 2 36 5 - 49 3 %    MCV 96 82 - 98 fL    MCH 31 0 26 8 - 34 3 pg    MCHC 32 3 31 4 - 37 4 g/dL    RDW 13 4 11 6 - 15 1 %    MPV 10 0 8 9 - 12 7 fL    Platelets 845 294 - 960 Thousands/uL    nRBC 0 /100 WBCs    Neutrophils Relative 70 43 - 75 %    Immat GRANS % 0 0 - 2 %    Lymphocytes Relative 18 14 - 44 %    Monocytes Relative 8 4 - 12 %    Eosinophils Relative 3 0 - 6 %    Basophils Relative 1 0 - 1 %    Neutrophils Absolute 4 78 1 85 - 7 62 Thousands/µL    Immature Grans Absolute 0 02 0 00 - 0 20 Thousand/uL    Lymphocytes Absolute 1 25 0 60 - 4 47 Thousands/µL    Monocytes Absolute 0 55 0 17 - 1 22 Thousand/µL    Eosinophils Absolute 0 23 0 00 - 0 61 Thousand/µL    Basophils Absolute 0 04 0 00 - 0 10 Thousands/µL   Basic metabolic panel    Collection Time: 09/13/18  5:43 AM   Result Value Ref Range    Sodium 142 136 - 145 mmol/L    Potassium 3 5 3 5 - 5 3 mmol/L    Chloride 104 100 - 108 mmol/L    CO2 31 21 - 32 mmol/L    ANION GAP 7 4 - 13 mmol/L    BUN 25 5 - 25 mg/dL    Creatinine 0 95 0 60 - 1 30 mg/dL    Glucose 97 65 - 140 mg/dL    Calcium 9 4 8 3 - 10 1 mg/dL    eGFR 72 ml/min/1 73sq m   Magnesium    Collection Time: 09/13/18  5:43 AM   Result Value Ref Range    Magnesium 2 0 1 6 - 2 6 mg/dL   Phosphorus    Collection Time: 09/13/18  5:43 AM   Result Value Ref Range    Phosphorus 2 8 2 3 - 4 1 mg/dL Cardiac testing:   Results for orders placed during the hospital encounter of 09/10/18   Echo complete with contrast if indicated    Narrative [address]  [address] [facility]  [place]  [phone]    Transthoracic Echocardiogram  2D, M-mode, Doppler, and Color Doppler    Study date:  12-Sep-2018    Patient: Ariel Santa  MR number: [id number]  Account number: [de-identified]  : 21-Dec-1930  Age: 80 years  Gender: Male  Status: Inpatient  Location: Bedside  Height: 67 in  Weight: 138 lb  BP: 130/ 60 mmHg    Indications: Hypertension    Diagnoses: I10  - Essential (primary) hypertension    Sonographer:  ALYSSA Alford  Primary Physician:  Cortes Tavarez MD  Group:  [facility]  Interpreting Physician:  Yaquelin Morales DO    SUMMARY    LEFT VENTRICLE:  Systolic function was normal by visual assessment  Ejection fraction was estimated to be 55 %  There were no regional wall motion abnormalities  There was mild concentric hypertrophy  Doppler parameters were consistent with abnormal left ventricular relaxation (grade 1 diastolic dysfunction)  VENTRICULAR SEPTUM:  There was paradoxical motion  These changes are consistent with LBBB  MITRAL VALVE:  There was trace regurgitation  AORTIC VALVE:  There was no evidence for stenosis  There was mild to moderate regurgitation  TRICUSPID VALVE:  There was mild regurgitation  Pulmonary artery systolic pressure was mildly increased  PULMONIC VALVE:  There was mild regurgitation  HISTORY: PRIOR HISTORY: HTN, HLD    PROCEDURE: The procedure was performed at the bedside  This was a routine study  The transthoracic approach was used  The study included complete 2D imaging, M-mode, complete spectral Doppler, and color Doppler  The heart rate was 72 bpm,  at the start of the study  Images were obtained from the parasternal, apical, subcostal, and suprasternal notch acoustic windows   Image quality was adequate  LEFT VENTRICLE: Size was normal  Systolic function was normal by visual assessment  Ejection fraction was estimated to be 55 %  There were no regional wall motion abnormalities  There was mild concentric hypertrophy  DOPPLER: Doppler  parameters were consistent with abnormal left ventricular relaxation (grade 1 diastolic dysfunction)  VENTRICULAR SEPTUM: There was paradoxical motion  These changes are consistent with LBBB  RIGHT VENTRICLE: The size was normal  Systolic function was normal  DOPPLER: Systolic pressure was within the normal range  LEFT ATRIUM: Size was normal  No thrombus was identified  RIGHT ATRIUM: Size was normal     MITRAL VALVE: Valve structure was normal  There was normal leaflet separation  No echocardiographic evidence for prolapse  DOPPLER: The transmitral velocity was within the normal range  There was no evidence for stenosis  There was trace  regurgitation  AORTIC VALVE: The valve was trileaflet  Leaflets exhibited mildly increased thickness, calcification, and lower normal cuspal separation  DOPPLER: Transaortic velocity was within the normal range  There was no evidence for stenosis  There  was mild to moderate regurgitation  TRICUSPID VALVE: The valve structure was normal  There was normal leaflet separation  DOPPLER: The transtricuspid velocity was within the normal range  There was mild regurgitation  Pulmonary artery systolic pressure was mildly increased  Estimated peak PA pressure was 42 mmHg  PULMONIC VALVE: Leaflets exhibited normal thickness, no calcification, and normal cuspal separation  DOPPLER: The transpulmonic velocity was within the normal range  There was mild regurgitation  PERICARDIUM: There was no thickening  There was no pericardial effusion  AORTA: The root exhibited normal size      PULMONARY ARTERY: The size was normal  The morphology appeared normal     SYSTEM MEASUREMENT TABLES    2D mode  AV Diam 2D: 3 1 cm  AoR Diam 2D: 3 1 cm  LA Diam (2D): 3 2 cm  LA/Ao (2D): 1 03  Left Atrium Yasmani-posterior Systolic Dimension; Mean; Mean value chosen; 2D mode;: 3 2 cm  FS (2D Teich): 37 6 %  IVSd (2D): 0 91 cm  IVSd2D: 0 91 cm  Interventricular Septum to Posterior Wall Thickness Ratio; 2D mode;: 1 01  LVDEV: 83 5 cm³  LVED Volume; Cube Method; 2D mode;: 80 1 cm³  LVESV: 26 8 cm³  LVIDd(2D): 4 31 cm  LVISd (2D): 2 69 cm  LVPWd (2D): 0 89 cm  Left Ventricle Internal End Diastolic Dimension; Mean; Mean value chosen; 2D mode;: 4 31 cm  Left Ventricle Internal Systolic Dimension; Mean; Mean value chosen; 2D mode;: 2 69 cm  Left Ventricle Posterior Wall Diastolic Thickness; Mean; Mean value chosen; 2D mode;: 0 89 cm  Left Ventricular Ejection Fraction; Cube Method; 2D mode;: 75 7 %  Left Ventricular Ejection Fraction; Teichholz; 2D mode;: 67 9 %  Left Ventricular End Systolic Volume; Cube Method; 2D mode;: 19 5 cm³  Left Ventricular Fractional Shortening; Cube Method; 2D mode;: 37 6 %  SV (Teich): 56 7 cm³  Stroke Volume; Cube Method; 2D mode;: 60 6 cm³    Apical four chamber  LVED Volume, Single Plane; 2D A4C: 94 cm³  LVEF A4C: 52 %  LVd Area; Single Plane; 2D; A4C: 31 cm squared  LVd major axis; Single Plane; 2D; A4C: 8 1 cm  Left Ventricle MOD Diam; Most recent value chosen; End Diastole; Apical four chamber;: 2 24 cm  Left Ventricle MOD Diam; Most recent value chosen; End Diastole; Apical four chamber;: 4 61 cm  Left Ventricle MOD Diam; Most recent value chosen; End Diastole; Apical four chamber;: 4 69 cm  Left Ventricle MOD Diam; Most recent value chosen; End Diastole; Apical four chamber;: 4 72 cm  Left Ventricle MOD Diam; Most recent value chosen; End Diastole; Apical four chamber;: 4 61 cm  Left Ventricle MOD Diam; Most recent value chosen; End Diastole; Apical four chamber;: 4 52 cm  Left Ventricle MOD Diam; Most recent value chosen; End Diastole; Apical four chamber;: 4 55 cm  Left Ventricle MOD Diam; Most recent value chosen;  End Diastole; Apical four chamber;: 4 43 cm  Left Ventricle MOD Diam; Most recent value chosen; End Diastole; Apical four chamber;: 4 26 cm  Left Ventricle MOD Diam; Most recent value chosen; End Diastole; Apical four chamber;: 4 15 cm  Left Ventricle MOD Diam; Most recent value chosen; End Diastole; Apical four chamber;: 4 1 cm  Left Ventricle MOD Diam; Most recent value chosen; End Diastole; Apical four chamber;: 4 1 cm  Left Ventricle MOD Diam; Most recent value chosen; End Diastole; Apical four chamber;: 3 98 cm  Left Ventricle MOD Diam; Most recent value chosen; End Diastole; Apical four chamber;: 3 63 cm  Left Ventricle MOD Diam; Most recent value chosen; End Diastole; Apical four chamber;: 3 13 cm  Left Ventricle MOD Diam; Most recent value chosen; End Diastole; Apical four chamber;: 2 93 cm  Left Ventricle MOD Diam; Most recent value chosen; End Diastole; Apical four chamber;: 2 9 cm  Left Ventricle MOD Diam; Most recent value chosen; End Diastole; Apical four chamber;: 2 84 cm  Left Ventricle MOD Diam; Most recent value chosen; End Diastole; Apical four chamber;: 2 59 cm  Left Ventricle MOD Diam; Most recent value chosen; End Diastole; Apical four chamber;: 2 24 cm  Left Ventricle MOD Diam; Most recent value chosen; End Systole; Apical four chamber;: 1 89 cm  Left Ventricle MOD Diam; Most recent value chosen; End Systole; Apical four chamber;: 3 65 cm  Left Ventricle MOD Diam; Most recent value chosen; End Systole; Apical four chamber;: 3 75 cm  Left Ventricle MOD Diam; Most recent value chosen; End Systole; Apical four chamber;: 3 8 cm  Left Ventricle MOD Diam; Most recent value chosen; End Systole; Apical four chamber;: 3 77 cm  Left Ventricle MOD Diam; Most recent value chosen; End Systole; Apical four chamber;: 3 68 cm  Left Ventricle MOD Diam; Most recent value chosen; End Systole; Apical four chamber;: 3 49 cm  Left Ventricle MOD Diam; Most recent value chosen; End Systole;  Apical four chamber;: 3 24 cm  Left Ventricle MOD Diam; Most recent value chosen; End Systole; Apical four chamber;: 3 01 cm  Left Ventricle MOD Diam; Most recent value chosen; End Systole; Apical four chamber;: 2 92 cm  Left Ventricle MOD Diam; Most recent value chosen; End Systole; Apical four chamber;: 2 78 cm  Left Ventricle MOD Diam; Most recent value chosen; End Systole; Apical four chamber;: 2 73 cm  Left Ventricle MOD Diam; Most recent value chosen; End Systole; Apical four chamber;: 2 67 cm  Left Ventricle MOD Diam; Most recent value chosen; End Systole; Apical four chamber;: 2 55 cm  Left Ventricle MOD Diam; Most recent value chosen; End Systole; Apical four chamber;: 2 28 cm  Left Ventricle MOD Diam; Most recent value chosen; End Systole; Apical four chamber;: 2 02 cm  Left Ventricle MOD Diam; Most recent value chosen; End Systole; Apical four chamber;: 1 77 cm  Left Ventricle MOD Diam; Most recent value chosen; End Systole; Apical four chamber;: 1 55 cm  Left Ventricle MOD Diam; Most recent value chosen; End Systole; Apical four chamber;: 1 38 cm  Left Ventricle MOD Diam; Most recent value chosen; End Systole; Apical four chamber;: 1 13 cm  Left Ventricle systolic major axis; Most recent value chosen; Method of Disks, Single Plane; 2D mode; Apical four chamber;: 7 29 cm  Left Ventricular End Systolic Volume; Most recent value chosen; Method of Disks, Single Plane; 2D mode; Apical four chamber;: 46 cm³  Left Ventricular Systolic Area; Most recent value chosen; Method of Disks, Single Plane; 2D mode; Apical four chamber;: 20 cm squared  SV A4C: 49 cm³    Tissue Doppler Imaging  LV Peak Early Morton Tissue Santiago; Medial MA (TDI): 50 7 mm/s  Left Ventricular Peak Early Diastolic Tissue Velocity; Mean; Mean value chosen; Medial Mitral Annulus;  Tissue Doppler Imaging;: 50 7 mm/s    Unspecified Scan Mode  Dec  Summit; Regurgitant Flow: 2760 mm/s2  Dec  Summit; Regurgitant Flow: 3200 mm/s2  Dec  Summit; Regurgitant Flow: 3110 mm/s2  Deceleration Schuyler; Mean; Mean value chosen; Regurgitant Flow;: 3020 mm/s2  Max P mm[Hg]  PHT; Regurgitant Flow: 440 ms  PHT; Regurgitant Flow: 365 ms  PHT; Regurgitant Flow: 362 ms  Pressure Half-Time; Mean; Mean value chosen; Regurgitant Flow;: 389 ms  V Max: 4140 mm/s  V Max: 3840 mm/s  V Max: 3990 mm/s  Vmax: 3990 mm/s  MV Peak Santiago/LV Peak Tissue Santiago E-Wave; Medial MA: 10 2  Deceleration Time; Antegrade Flow;: 289 ms  Deceleration Time; Mean; Mean value chosen; Antegrade Flow;: 289 ms  MV E/A Ratio: 0 4  MV Peak A Santiago: 1160 mm/s  MV Peak E Santiago  Mean: 518 mm/s  Mitral Valve A-Wave Peak Velocity; Mean; Mean value chosen; Antegrade Flow;: 1160 mm/s  Mitral Valve E-Wave Peak Velocity; Antegrade Flow;: 518 mm/s  RAP: 10 mm[Hg]  RVSP: 34 mm[Hg]  Max P mm[Hg]  V Max: 2470 mm/s  Vmax: 2470 mm/s  TAPSE: 3 4 cm  TAPSE: 2 9 cm  TAPSE: 3 4 cm  TAPSE: 2 9 cm    [facility] Accredited Echocardiography Laboratory    Prepared and electronically signed by    Sharath Ceron DO  Signed 13-Sep-2018 14:33:51       No results found for this or any previous visit  No results found for this or any previous visit  No results found for this or any previous visit    Results for orders placed during the hospital encounter of 09/10/18   NM myocardial perfusion spect (stress and/or rest)    [address]  [address] [facility] [address]  [phone]    Rest/Stress Gated SPECT Myocardial Perfusion Imaging After Regadenoson    Patient: Angelina Solis  MR number: [id number]  Account number: [de-identified]  : 1930  Age: 80 years  Gender: Male  Status: Outpatient  Location: Stress lab  Height: 67 in  Weight: 150 lb  BP: 177/ 75 mmHg    Allergies: PENICILLINS    Diagnosis: I21 4 - Non-ST elevation (NSTEMI) myocardial infarction    Primary Physician:  Temple Bence  Technician:  Wicho Martínez  RN:  BRENT SmithN  Group:  [facility] Senters  Report Prepared By[de-identified]  Maldonado Aguirre, BSN  Interpreting Physician:  Ken Alexander DO    INDICATIONS: Evaluation for coronary artery disease  HISTORY: The patient is a 80year old  male  Chest pain status: no chest pain  Coronary artery disease risk factors: dyslipidemia and hypertension  Cardiovascular history: none significant  Medications: a diuretic, a lipid  lowering agent, and an antihypertensive agent  PHYSICAL EXAM: Baseline physical exam screening: no wheezes audible  REST ECG: Normal sinus rhythm  Normal baseline ECG  The ECG showed left bundle branch block  PROCEDURE: The study was performed in the the Stress lab  A regadenoson infusion pharmacologic stress test was performed  Gated SPECT myocardial perfusion imaging was performed after stress and at rest  Systolic blood pressure was 177  mmHg, at the start of the study  Diastolic blood pressure was 75 mmHg, at the start of the study  The heart rate was 71 bpm, at the start of the study  Patient was not experiencing chest pain at the time of the injection of the  radiopharmaceutical  IV double checked  Regadenoson protocol:  Time HR bpm SBP mmHg DBP mmHg Symptoms ST change Rhythm/conduct  Baseline 12:33 71 177 75 none none NSR, no ectopy, NSR, LBBB  Immediate 12:39 109 136 80 nausea -- sinus tach, LBBB  1 min 12:40 94 148 69 same as above -- --  2 min 12:41 91 159 78 subsiding -- --  3 min 12:42 89 156 68 subsiding -- --  4 min 12:43 87 155 72 none -- --  No medications or fluids given  STRESS SUMMARY: Duration of pharmacologic stress was 3 min  Functional capacity was normal  Maximal heart rate during stress was 113 bpm  The heart rate response to stress was normal  There was normal resting blood pressure with an  appropriate response to stress  The rate-pressure product for the peak heart rate and blood pressure was 62619  There was no chest pain during stress  The stress test was terminated due to protocol completion  Pre oxygen saturation: 96 %    Peak oxygen saturation: 99 %  The stress ECG was negative for ischemia and normal  There were no stress arrhythmias or conduction abnormalities  ISOTOPE ADMINISTRATION:  Resting isotope administration Stress isotope administration  Agent Sestamibi Sestamibi  Dose 10 mCi 30 6 mCi  Date 09/12/2018 09/12/2018    The radiopharmaceutical was injected at the peak effect of pharmacologic stress  MYOCARDIAL PERFUSION IMAGING:  The image quality was good  Left ventricular size was normal     PERFUSION DEFECTS:  -  There were no perfusion defects  GATED SPECT:  The calculated left ventricular ejection fraction was 58 %  Left ventricular ejection fraction was within normal limits by visual estimate  There was no diagnostic evidence for left ventricular regional abnormality  SUMMARY:  -  Stress results: Target heart rate was achieved  There was no chest pain during stress  -  ECG conclusions: The stress ECG was negative for ischemia and normal   -  Perfusion imaging: There were no perfusion defects   -  Gated SPECT: The calculated left ventricular ejection fraction was 58 %  Left ventricular ejection fraction was within normal limits by visual estimate  There was no diagnostic evidence for left ventricular regional abnormality  IMPRESSIONS: Normal study after pharmacologic stress  Myocardial perfusion imaging was normal at rest and with stress  Left ventricular systolic function was normal     Prepared and signed by    Everett Urena DO  Signed 09/12/2018 14:56:53       No results found for this or any previous visit

## 2018-09-13 NOTE — SOCIAL WORK
SW following to assist with DCP  Case discussed with SALOME and Dr Bull Salomon  Per CM pt was brought to hospital by police after driving/wandering from [place]  Pt suffers from dementia  Per Dr Bull Salomon pt is stable for discharge and he has tried to contact daughter to discuss plan without success/no call back  SW obtained daughter's phone number from Dr Bull Salomon  SW called daughter Elvia Tierney) at [phone] and left message regarding anticipated discharge today and transportation plans  SW also contacted pt's wife to discuss same  Pt's wife stated that she will try to get in touch with her daughter to see if they can pick pt up today  Daughter and grandson will need to coordinate plans per wife because pt's car needs to be picked up as well  MICKI offered that transportation (private pay) could be arranged if needed  MICKI also reviewed pt's Medicare Discharge Rights with wife  Pt's wife took SW phone number to call back once she talks to daughter and knows plan  SW will be available to assist as needed
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
## 2018-09-13 NOTE — NURSING NOTE
No Iv access, belongings gathered by pca, patient helped dressed by pca, discharge information provided to daughter, daughter stated understanding, no new medications or prescriptions  Given, patient left floor via wheelchair by pca, accompanied by daughter and grandson, discharged to home  Daughter will drive patient home, grandson will  patients car and drive it to his house

## 2018-09-13 NOTE — DISCHARGE INSTRUCTIONS
Dementia   AMBULATORY CARE:   Dementia  is a condition that causes loss of memory, thought control, and judgment  Dementia may develop quickly over a few months after a head injury or stroke  It may develop slowly over many years if you have Alzheimer disease  Dementia cannot be cured or prevented, but treatment may slow or reduce your symptoms  Common symptoms include the following:   · Loss of short-term memory, followed by loss of long-term memory    · Trouble remembering to go to the bathroom, to urinate, or have a bowel movement    · Anger or violent behavior     · Depression, anxiety, or hallucinations  Seek care immediately if  you or someone close to you notices:  · You have signs of delirium, such as extreme confusion, and seeing or hearing things that are not there  · You become angry or violent, and cannot be calmed down  · You faint and cannot be woken  Contact your healthcare provider if  you or someone close to you notices:  · You have a fever  · You have increased confusion, behavior, or mood changes  · You have questions or concerns about your condition or care  Treatment for dementia  may include medicines to slow memory loss  You may also need medicines to help control anger, decrease anxiety, or improve your mood  Take your medicines as directed  Manage dementia:   · Keep your mind and body active  Do activities that you love, such as art, gardening, or listening to music  Call or visit people often  This will keep your social skills sharp, and may help reduce depression  · Write daily schedules and routines  Record medical appointments, times to take your medicines, meal times, or any other things to remember  Write down reminders to use the bathroom if you have trouble remembering  You may need to ask someone to write things down for you  · Place clocks and calendars where you can see them  This will help you remember appointments and tasks  · Do not smoke  Nicotine and other chemicals in cigarettes and cigars can cause lung damage  Ask your healthcare provider for information if you currently smoke and need help to quit  E-cigarettes or smokeless tobacco still contain nicotine  Talk to your healthcare provider before you use these products  · Eat healthy foods  Examples are fruits, vegetables, whole-grain breads, low-fat dairy products, beans, lean meats, and fish  Ask if you need to be on a special diet  · Ask your healthcare provider for a list of organizations that can help  You may begin to need an in-home aide to help you remember your daily tasks  Arrange for help while you are thinking clearly  Follow up with your healthcare provider as directed:  Ask someone to go with you to help you remember what your healthcare provider tells you  The person can take notes for you during the visit and go over the notes with you later  Write down your questions so you remember to ask them during your visits  © 2017 [employer] Information is for End User's use only and may not be sold, redistributed or otherwise used for commercial purposes  All illustrations and images included in CareNotes® are the copyrighted property of [employer]  or Ayush Carvajal  The above information is an  only  It is not intended as medical advice for individual conditions or treatments  Talk to your doctor, nurse or pharmacist before following any medical regimen to see if it is safe and effective for you
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
